# Patient Record
Sex: MALE | URBAN - METROPOLITAN AREA
[De-identification: names, ages, dates, MRNs, and addresses within clinical notes are randomized per-mention and may not be internally consistent; named-entity substitution may affect disease eponyms.]

---

## 2022-01-17 ENCOUNTER — NURSE TRIAGE (OUTPATIENT)
Dept: NURSING | Facility: CLINIC | Age: 22
End: 2022-01-17

## 2022-01-17 NOTE — TELEPHONE ENCOUNTER
José Miguel calling to request doctor's note to clear him for international travel.    Timeline:  1/1 developed COVID symptoms  1/3 - symptoms have significantly improved  1/8 tested positive using [facility] at home test kit  1/14 tested at [facility] and was positive again for COVID  1/18 - scheduled tomorrow for testing    Patient unable to book a ticket to go home (international travel) after graduation due to positive test result.    FNA advised to test tomorrow as scheduled. Then call clinic to request for note. It is possible to continue testing positive for 3 months from initial covid infection.    FNA advised that [facility] is closed today due to federal holiday.    Madai Moran RN/[place] Nurse Advisor    Additional Information    Information only question and nurse able to answer    Protocols used: INFORMATION ONLY CALL - NO TRIAGE-A-[place]